# Patient Record
Sex: FEMALE | Race: WHITE | NOT HISPANIC OR LATINO
[De-identification: names, ages, dates, MRNs, and addresses within clinical notes are randomized per-mention and may not be internally consistent; named-entity substitution may affect disease eponyms.]

---

## 2020-10-27 ENCOUNTER — APPOINTMENT (OUTPATIENT)
Dept: CARDIOLOGY | Facility: CLINIC | Age: 25
End: 2020-10-27

## 2020-11-20 PROBLEM — Z00.00 ENCOUNTER FOR PREVENTIVE HEALTH EXAMINATION: Status: ACTIVE | Noted: 2020-11-20

## 2020-11-24 ENCOUNTER — APPOINTMENT (OUTPATIENT)
Dept: CARDIOLOGY | Facility: CLINIC | Age: 25
End: 2020-11-24
Payer: COMMERCIAL

## 2020-11-24 ENCOUNTER — NON-APPOINTMENT (OUTPATIENT)
Age: 25
End: 2020-11-24

## 2020-11-24 VITALS
TEMPERATURE: 98.5 F | SYSTOLIC BLOOD PRESSURE: 124 MMHG | DIASTOLIC BLOOD PRESSURE: 82 MMHG | HEIGHT: 62 IN | BODY MASS INDEX: 27.23 KG/M2 | OXYGEN SATURATION: 100 % | HEART RATE: 110 BPM | WEIGHT: 148 LBS

## 2020-11-24 DIAGNOSIS — Z78.9 OTHER SPECIFIED HEALTH STATUS: ICD-10-CM

## 2020-11-24 DIAGNOSIS — Z82.49 FAMILY HISTORY OF ISCHEMIC HEART DISEASE AND OTHER DISEASES OF THE CIRCULATORY SYSTEM: ICD-10-CM

## 2020-11-24 PROCEDURE — 93000 ELECTROCARDIOGRAM COMPLETE: CPT | Mod: 59

## 2020-11-24 PROCEDURE — 0296T: CPT

## 2020-11-24 PROCEDURE — 99203 OFFICE O/P NEW LOW 30 MIN: CPT

## 2020-11-24 PROCEDURE — 99072 ADDL SUPL MATRL&STAF TM PHE: CPT

## 2020-11-24 NOTE — PHYSICAL EXAM
[General Appearance - Well Developed] : well developed [Normal Appearance] : normal appearance [Well Groomed] : well groomed [General Appearance - Well Nourished] : well nourished [No Deformities] : no deformities [General Appearance - In No Acute Distress] : no acute distress [Normal Conjunctiva] : the conjunctiva exhibited no abnormalities [Eyelids - No Xanthelasma] : the eyelids demonstrated no xanthelasmas [Normal Oral Mucosa] : normal oral mucosa [No Oral Pallor] : no oral pallor [No Oral Cyanosis] : no oral cyanosis [Normal Jugular Venous A Waves Present] : normal jugular venous A waves present [Normal Jugular Venous V Waves Present] : normal jugular venous V waves present [No Jugular Venous Garsia A Waves] : no jugular venous garsia A waves [Heart Rate And Rhythm] : heart rate and rhythm were normal [Heart Sounds] : normal S1 and S2 [Murmurs] : no murmurs present [Respiration, Rhythm And Depth] : normal respiratory rhythm and effort [Exaggerated Use Of Accessory Muscles For Inspiration] : no accessory muscle use [Auscultation Breath Sounds / Voice Sounds] : lungs were clear to auscultation bilaterally [Abdomen Soft] : soft [Abdomen Tenderness] : non-tender [Abdomen Mass (___ Cm)] : no abdominal mass palpated [Abnormal Walk] : normal gait [Gait - Sufficient For Exercise Testing] : the gait was sufficient for exercise testing [Nail Clubbing] : no clubbing of the fingernails [Cyanosis, Localized] : no localized cyanosis [Petechial Hemorrhages (___cm)] : no petechial hemorrhages [Skin Color & Pigmentation] : normal skin color and pigmentation [] : no rash [No Venous Stasis] : no venous stasis [Skin Lesions] : no skin lesions [No Skin Ulcers] : no skin ulcer [No Xanthoma] : no  xanthoma was observed

## 2020-12-14 ENCOUNTER — APPOINTMENT (OUTPATIENT)
Dept: CARDIOLOGY | Facility: CLINIC | Age: 25
End: 2020-12-14
Payer: COMMERCIAL

## 2020-12-14 PROCEDURE — 93306 TTE W/DOPPLER COMPLETE: CPT

## 2020-12-14 PROCEDURE — 99072 ADDL SUPL MATRL&STAF TM PHE: CPT

## 2020-12-21 NOTE — REASON FOR VISIT
[Consultation] : a consultation regarding [FreeTextEntry2] : chest pain, palpitations, sinus tachycardia [FreeTextEntry1] : María Elena is a 25-year-old female with history of no prior cardiac or medical history.\par \par No history of CAD, MI, revascularization, VHD, CHF, TIA, CVA, diabetes, PVD, DVT, PE, arrhythmia, AF.\par \par Patient has recurrent central chest pain over the past 3 months.  Chest pain, described as pressure tightness mild intensity lasting several seconds nonradiating nonreproducible, random times rest and exertion.\par \par Patient is exercising 30 minutes without exertional symptoms.\par \par EKG 11/24/2020 sinus tachycardia 107 bpm, normal tracing\par \par  Labs September 2020 normal CBC, BMP, LFT, TSH, fasting cholesterol 248, triglyceride 147, HDL 64,

## 2020-12-21 NOTE — DISCUSSION/SUMMARY
[FreeTextEntry1] : María Elena is a 25-year-old female with medical history detailed above and active medical issues including:\par \par - Recurrent chest pain, nonischemic baseline EKG. patient will have noninvasive testing with exercise stress echo to assess for obstructive CAD, HR and BP response, exercise-induced arrhythmia, echocardiogram for LVEF, structural heart disease, carotid and abdominal ultrasound to assess for obstructive PAD.\par \par -Palpitations, sinus tachycardia.  Zio patch 2-week heart monitor started today.  We will obtain recent labs.  Recommended increased oral hydration with electrolyte suppliment drinks, avoid caffeine and alcohol intake.\par \par Patient will be seen in cardiology follow-up after noninvasive testing.\par \par Advised patient to follow active lifestyle with regular cardiovascular exercise. Patient educated on lifestyle and diet modification with low sodium low fat diet and avoidance of excessive alcohol. Patient is aware to call with any symptoms or concerns.\par \par María Elena will follow up with Dr. Aydin Gan for primary care. \par

## 2021-01-04 PROCEDURE — 93248 EXT ECG>7D<15D REV&INTERPJ: CPT

## 2021-01-04 PROCEDURE — 99072 ADDL SUPL MATRL&STAF TM PHE: CPT

## 2021-01-14 ENCOUNTER — APPOINTMENT (OUTPATIENT)
Dept: CARDIOLOGY | Facility: CLINIC | Age: 26
End: 2021-01-14
Payer: COMMERCIAL

## 2021-01-14 PROCEDURE — 99072 ADDL SUPL MATRL&STAF TM PHE: CPT

## 2021-01-14 PROCEDURE — 93351 STRESS TTE COMPLETE: CPT

## 2021-01-26 ENCOUNTER — APPOINTMENT (OUTPATIENT)
Dept: CARDIOLOGY | Facility: CLINIC | Age: 26
End: 2021-01-26
Payer: COMMERCIAL

## 2021-01-26 VITALS
DIASTOLIC BLOOD PRESSURE: 80 MMHG | HEART RATE: 95 BPM | HEIGHT: 62 IN | BODY MASS INDEX: 27.6 KG/M2 | SYSTOLIC BLOOD PRESSURE: 128 MMHG | WEIGHT: 150 LBS | OXYGEN SATURATION: 99 %

## 2021-01-26 PROCEDURE — 99214 OFFICE O/P EST MOD 30 MIN: CPT

## 2021-01-26 PROCEDURE — 99072 ADDL SUPL MATRL&STAF TM PHE: CPT

## 2021-01-26 NOTE — REASON FOR VISIT
[Consultation] : a consultation regarding [FreeTextEntry2] : chest pain, palpitations, sinus tachycardia [FreeTextEntry1] : María Elena is a 25-year-old female with history of no prior cardiac or medical history. She presents to the clinic today to review recent cardiovascular testing. 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 \par No recent illness or hospital stay.\par Sleeping well at night. No significant alcohol or caffeine intake. \par \par No history of CAD, MI, revascularization, VHD, CHF, TIA, CVA, diabetes, PVD, DVT, PE, arrhythmia, AF.\par \par Patient is exercising 30 minutes without exertional symptoms.\par \par \par TESTING:\par \par Echo 12/14/2020 EF 65%, mild MR\par \par Stress echo 1/14/21 normal HR/BP response to exercise, exercised for 11 METS, no evidence of ischemia by EKG\par \par 14 day ZIO monitor 11/24/20 min HR 63, max , avg HR 104bpm, rare ectopy and no significant arrhythmias \par \par EKG 11/24/2020 sinus tachycardia 107 bpm, normal tracing\par \par  Labs September 2020 normal CBC, BMP, LFT, TSH, fasting cholesterol 248, triglyceride 147, HDL 64,

## 2021-01-26 NOTE — DISCUSSION/SUMMARY
[FreeTextEntry1] : María Elena is a 25-year-old female with medical history detailed above and active medical issues including:\par \par - History of chest pain, nonischemic baseline EKG. Symptoms of chest discomfort have improved. Echo demonstrated normal heart structure and function. Stress echo without evidence of ischemia. \par \par -Palpitations, sinus tachycardia.  Zio cardiac event monitor demonstrated sinus rhythm with average HR 104bpm.  Recommended increased oral hydration with electrolyte supplement drinks, avoid caffeine and alcohol intake. Patient is interested in trial of beta blocker to see if will help her symptoms. Toprol 25mg QD will be added to regimen. She will call and follow-up with me over the phone in about 2 weeks.\par \par María Elena will follow up with Dr. Aydin Gan for primary care. \par \par Red flag symptoms which would warrant sooner emergent evaluation reviewed with the patient. \par Questions and concerns were addressed and answered. \par \par Sincerely,\par \par Radha Tello PA-C\par Patients history, testing and plan reviewed with supervising MD: Dr. Patrick Valentino

## 2021-01-26 NOTE — PHYSICAL EXAM
[General Appearance - Well Developed] : well developed [Normal Appearance] : normal appearance [Well Groomed] : well groomed [General Appearance - Well Nourished] : well nourished [No Deformities] : no deformities [General Appearance - In No Acute Distress] : no acute distress [Normal Conjunctiva] : the conjunctiva exhibited no abnormalities [Eyelids - No Xanthelasma] : the eyelids demonstrated no xanthelasmas [Normal Oral Mucosa] : normal oral mucosa [No Oral Pallor] : no oral pallor [No Oral Cyanosis] : no oral cyanosis [Normal Jugular Venous A Waves Present] : normal jugular venous A waves present [Normal Jugular Venous V Waves Present] : normal jugular venous V waves present [No Jugular Venous Garsia A Waves] : no jugular venous garsia A waves [Respiration, Rhythm And Depth] : normal respiratory rhythm and effort [Exaggerated Use Of Accessory Muscles For Inspiration] : no accessory muscle use [Auscultation Breath Sounds / Voice Sounds] : lungs were clear to auscultation bilaterally [Heart Rate And Rhythm] : heart rate and rhythm were normal [Heart Sounds] : normal S1 and S2 [Murmurs] : no murmurs present [Abdomen Soft] : soft [Abdomen Tenderness] : non-tender [Abdomen Mass (___ Cm)] : no abdominal mass palpated [Abnormal Walk] : normal gait [Gait - Sufficient For Exercise Testing] : the gait was sufficient for exercise testing [Nail Clubbing] : no clubbing of the fingernails [Cyanosis, Localized] : no localized cyanosis [Petechial Hemorrhages (___cm)] : no petechial hemorrhages [] : no rash [Skin Color & Pigmentation] : normal skin color and pigmentation [No Venous Stasis] : no venous stasis [Skin Lesions] : no skin lesions [No Skin Ulcers] : no skin ulcer [No Xanthoma] : no  xanthoma was observed

## 2022-02-02 ENCOUNTER — APPOINTMENT (OUTPATIENT)
Dept: CARDIOLOGY | Facility: CLINIC | Age: 27
End: 2022-02-02
Payer: COMMERCIAL

## 2022-02-02 ENCOUNTER — RX CHANGE (OUTPATIENT)
Age: 27
End: 2022-02-02

## 2022-02-02 ENCOUNTER — NON-APPOINTMENT (OUTPATIENT)
Age: 27
End: 2022-02-02

## 2022-02-02 VITALS
SYSTOLIC BLOOD PRESSURE: 120 MMHG | WEIGHT: 160 LBS | HEIGHT: 61 IN | TEMPERATURE: 97.5 F | DIASTOLIC BLOOD PRESSURE: 70 MMHG | OXYGEN SATURATION: 98 % | BODY MASS INDEX: 30.21 KG/M2 | HEART RATE: 108 BPM

## 2022-02-02 PROCEDURE — 99072 ADDL SUPL MATRL&STAF TM PHE: CPT

## 2022-02-02 PROCEDURE — 93306 TTE W/DOPPLER COMPLETE: CPT

## 2022-02-02 PROCEDURE — 93242 EXT ECG>48HR<7D RECORDING: CPT

## 2022-02-02 PROCEDURE — 93000 ELECTROCARDIOGRAM COMPLETE: CPT | Mod: 59

## 2022-02-02 PROCEDURE — 99214 OFFICE O/P EST MOD 30 MIN: CPT | Mod: 25

## 2022-02-02 RX ORDER — NORGESTIMATE AND ETHINYL ESTRADIOL 0.25-0.035
KIT ORAL
Refills: 0 | Status: ACTIVE | COMMUNITY

## 2022-02-02 NOTE — REASON FOR VISIT
[Consultation] : a consultation regarding [Other: ____] : [unfilled] [FreeTextEntry1] : María Elena is a 26-year-old female with history of no prior cardiac or medical history.\par \par Patient had continued symptoms of palpitations and her primary physician changed Toprol to Lopressor 25 mg twice daily.  Patient does not wish to take twice the prior dose.  Toprol restarted at 25 mg 1-1/2 tablet evening dose which can be taken in the a.m. if continued symptoms.  Recommended increased oral hydration with electrolyte suppliment drinks, avoid caffeine and alcohol intake.\par \par No history of CAD, MI, revascularization, VHD, CHF, TIA, CVA, diabetes, PVD, DVT, PE, arrhythmia, AF.\par \par Patient has occasional sharp chest pain lasting several seconds.  Patient has continued palpitations rapid pulse throughout the entire day, no associated dizziness or syncope.  Cardiovascular review of symptoms is negative for exertional chest pain, dyspnea.  No PND or orthopnea leg edema.  No bleeding or black stool.\par \par Patient is exercising 30 minutes without exertional symptoms.  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 \par \par EKG Feb 2022 sinus rhythm 97 bpm\par \par EKG 11/24/2020 sinus tachycardia 107 bpm, normal tracing\par \par  Labs September 2020 normal CBC, BMP, LFT, TSH, fasting cholesterol 248, triglyceride 147, HDL 64,

## 2022-02-02 NOTE — DISCUSSION/SUMMARY
[FreeTextEntry1] : María Elena is a 25-year-old female with medical history detailed above and active medical issues including:\par \par - Recurrent chest pain, nonischemic baseline EKG. patient will have noninvasive testing with exercise stress echo to assess for obstructive CAD, HR and BP response, exercise-induced arrhythmia, echocardiogram for LVEF, structural heart disease, carotid and abdominal ultrasound to assess for obstructive PAD.\par \par - Palpitations, inappropriate sinus tachycardia.  Normal LVEF echo Dec 2020 patient had continued symptoms of palpitations and her primary physician changed Toprol to Lopressor 25 mg twice daily.  Patient does not wish to take twice the prior dose.  Toprol restarted at 25 mg 1-1/2 tablet evening dose which can be taken in the a.m. if continued symptoms.  Recommended increased oral hydration with electrolyte suppliment drinks, avoid caffeine and alcohol intake.  Patient referred to EP Dr Rascon.\par \par Echocardiogram ordered to evaluate for structural heart disease, Zio patch 1 week heart monitor started today with TEB follow-up.\par \par Advised patient to follow active lifestyle with regular cardiovascular exercise. Patient educated on lifestyle and diet modification with low sodium low fat diet and avoidance of excessive alcohol. Patient is aware to call with any symptoms or concerns.\par \par María Elena will follow up with Dr. Aydin Gan for primary care. \par \par 
yes

## 2022-02-24 ENCOUNTER — APPOINTMENT (OUTPATIENT)
Dept: CARDIOLOGY | Facility: CLINIC | Age: 27
End: 2022-02-24
Payer: COMMERCIAL

## 2022-02-24 PROCEDURE — 99443: CPT | Mod: 95

## 2022-03-01 PROCEDURE — 99072 ADDL SUPL MATRL&STAF TM PHE: CPT

## 2022-03-01 PROCEDURE — 93244 EXT ECG>48HR<7D REV&INTERPJ: CPT

## 2022-03-30 ENCOUNTER — APPOINTMENT (OUTPATIENT)
Dept: ELECTROPHYSIOLOGY | Facility: CLINIC | Age: 27
End: 2022-03-30

## 2022-05-30 ENCOUNTER — RX RENEWAL (OUTPATIENT)
Age: 27
End: 2022-05-30

## 2022-07-19 ENCOUNTER — RX CHANGE (OUTPATIENT)
Age: 27
End: 2022-07-19

## 2022-08-09 ENCOUNTER — APPOINTMENT (OUTPATIENT)
Dept: CARDIOLOGY | Facility: CLINIC | Age: 27
End: 2022-08-09

## 2022-09-26 ENCOUNTER — RX CHANGE (OUTPATIENT)
Age: 27
End: 2022-09-26

## 2023-02-23 PROBLEM — R00.0 INAPPROPRIATE SINUS TACHYCARDIA: Status: ACTIVE | Noted: 2022-02-02

## 2023-02-23 PROBLEM — R07.89 ATYPICAL CHEST PAIN: Status: ACTIVE | Noted: 2020-11-24

## 2023-02-23 PROBLEM — R00.2 PALPITATIONS: Status: ACTIVE | Noted: 2021-01-26

## 2023-02-23 PROBLEM — R00.0 TACHYCARDIA: Status: ACTIVE | Noted: 2020-11-24

## 2023-02-23 PROBLEM — R07.89 CHEST TIGHTNESS OR PRESSURE: Status: ACTIVE | Noted: 2020-11-24

## 2023-02-28 ENCOUNTER — APPOINTMENT (OUTPATIENT)
Dept: CARDIOLOGY | Facility: CLINIC | Age: 28
End: 2023-02-28
Payer: COMMERCIAL

## 2023-02-28 VITALS
BODY MASS INDEX: 28.32 KG/M2 | WEIGHT: 150 LBS | OXYGEN SATURATION: 99 % | SYSTOLIC BLOOD PRESSURE: 120 MMHG | HEART RATE: 100 BPM | DIASTOLIC BLOOD PRESSURE: 82 MMHG | HEIGHT: 61 IN | TEMPERATURE: 98.2 F

## 2023-02-28 DIAGNOSIS — R00.2 PALPITATIONS: ICD-10-CM

## 2023-02-28 DIAGNOSIS — R00.0 TACHYCARDIA, UNSPECIFIED: ICD-10-CM

## 2023-02-28 DIAGNOSIS — R07.89 OTHER CHEST PAIN: ICD-10-CM

## 2023-02-28 PROCEDURE — 99215 OFFICE O/P EST HI 40 MIN: CPT

## 2023-02-28 PROCEDURE — 93306 TTE W/DOPPLER COMPLETE: CPT

## 2023-02-28 RX ORDER — METOPROLOL SUCCINATE 25 MG/1
25 TABLET, EXTENDED RELEASE ORAL
Qty: 135 | Refills: 1 | Status: ACTIVE | COMMUNITY
Start: 2021-01-26 | End: 1900-01-01

## 2023-02-28 NOTE — REASON FOR VISIT
[Other: ____] : [unfilled] [FreeTextEntry1] : María Elena is a 27-year-old female with history of no prior cardiac or medical history.\par \par Patient does not wish to take twice the prior dose.  Patient has rare brief palpitations on  Toprol  25 mg 1-1/2 tablet evening dose which can be taken in the a.m. if continued symptoms.  Recommended increased oral hydration with electrolyte suppliment drinks, avoid caffeine and alcohol intake.\par \par No history of CAD, MI, revascularization, VHD, CHF, TIA, CVA, diabetes, PVD, DVT, PE, arrhythmia, AF.\par \par Patient has occasional sharp chest pain lasting several seconds.  Patient has continued palpitations rapid pulse throughout the entire day, no associated dizziness or syncope.  Cardiovascular review of symptoms is negative for exertional chest pain, dyspnea.  No PND or orthopnea leg edema.  No bleeding or black stool.\par \par Patient is exercising 30 minutes without exertional symptoms.  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 \par \par EKG Feb 2022 sinus rhythm 97 bpm\par \par EKG 11/24/2020 sinus tachycardia 107 bpm, normal tracing\par \par  Labs September 2020 normal CBC, BMP, LFT, TSH, fasting cholesterol 248, triglyceride 147, HDL 64,

## 2023-02-28 NOTE — DISCUSSION/SUMMARY
[FreeTextEntry1] : María Elena is a 27-year-old female with medical history detailed above and active medical issues including:\par \par - Palpitations, inappropriate sinus tachycardia.  Normal LVEF echo Feb 2022 repeat echo ordered.  Patient does not wish to take twice the prior dose.  Patient has rare brief palpitations on  Toprol  25 mg 1-1/2 tablet evening dose which can be taken in the a.m. if continued symptoms.  Recommended increased oral hydration with electrolyte suppliment drinks, avoid caffeine and alcohol intake.\par \par - Atypical chest pain resolved, normal exercise stress echo Jan 2021.  Patient maintaining exercise with [employer] 20  to 40 minutes without exertional symptoms. \par \par Echocardiogram ordered to evaluate for structural heart disease with TEB follow-up.\par \par Advised patient to follow active lifestyle with regular cardiovascular exercise. Patient educated on lifestyle and diet modification with low sodium low fat diet and avoidance of excessive alcohol. Patient is aware to call with any symptoms or concerns.\par \par María Elena will follow up with Dr. Aydin Gan for primary care. \par \par